# Patient Record
Sex: FEMALE | Race: WHITE
[De-identification: names, ages, dates, MRNs, and addresses within clinical notes are randomized per-mention and may not be internally consistent; named-entity substitution may affect disease eponyms.]

---

## 2019-01-04 ENCOUNTER — HOSPITAL ENCOUNTER (EMERGENCY)
Dept: HOSPITAL 41 - JD.ED | Age: 74
Discharge: HOME | End: 2019-01-04
Payer: MEDICARE

## 2019-01-04 DIAGNOSIS — K44.9: Primary | ICD-10-CM

## 2019-01-04 DIAGNOSIS — Z79.899: ICD-10-CM

## 2019-01-04 DIAGNOSIS — K21.9: ICD-10-CM

## 2019-01-04 PROCEDURE — 84484 ASSAY OF TROPONIN QUANT: CPT

## 2019-01-04 PROCEDURE — 99284 EMERGENCY DEPT VISIT MOD MDM: CPT

## 2019-01-04 PROCEDURE — 83690 ASSAY OF LIPASE: CPT

## 2019-01-04 PROCEDURE — 82553 CREATINE MB FRACTION: CPT

## 2019-01-04 PROCEDURE — 85007 BL SMEAR W/DIFF WBC COUNT: CPT

## 2019-01-04 PROCEDURE — 74018 RADEX ABDOMEN 1 VIEW: CPT

## 2019-01-04 PROCEDURE — 71045 X-RAY EXAM CHEST 1 VIEW: CPT

## 2019-01-04 PROCEDURE — 80053 COMPREHEN METABOLIC PANEL: CPT

## 2019-01-04 PROCEDURE — 36415 COLL VENOUS BLD VENIPUNCTURE: CPT

## 2019-01-04 PROCEDURE — 96360 HYDRATION IV INFUSION INIT: CPT

## 2019-01-04 PROCEDURE — 85027 COMPLETE CBC AUTOMATED: CPT

## 2019-01-04 PROCEDURE — 83735 ASSAY OF MAGNESIUM: CPT

## 2019-01-04 PROCEDURE — 93005 ELECTROCARDIOGRAM TRACING: CPT

## 2019-01-04 NOTE — CR
Chest: Frontal view of the chest was obtained.

 

Comparison: Prior chest x-ray of 12/03/11.

 

Heart size and mediastinum are within normal limits.  Mild bibasilar 

atelectasis is seen.  Lungs otherwise are clear.  Bony structures are 

grossly intact.

 

Impression:

1.  Slight bibasilar atelectasis, nothing acute seen on frontal chest 

x-ray.

 

Diagnostic code #2

## 2019-01-04 NOTE — EDM.PDOC
<Noam Chavez - Last Filed: 01/04/19 07:53>





ED HPI GENERAL MEDICAL PROBLEM





- General


Chief Complaint: Abdominal Pain


Stated Complaint: HEARTBURN PAIN


Time Seen by Provider: 01/04/19 06:09





- Related Data


 Allergies











Allergy/AdvReac Type Severity Reaction Status Date / Time


 


No Known Allergies Allergy   Verified 01/04/19 06:01











Home Meds: 


 Home Meds





Levothyroxine Sodium 88 mcg PO DAILY 04/10/14 [History]


Alendronate Sodium [Fosamax] 1 tab PO WEEKLY 01/04/19 [History]


Hyoscyamine Sulfate [Levsin-Sl] 0.125 mg SL ASDIRECTED #8 tab.subl 01/04/19 [Rx]











Course





- Vital Signs


Last Recorded V/S: 


 Last Vital Signs











Temp  36.5 C   01/04/19 05:57


 


Pulse  78   01/04/19 05:57


 


Resp  18   01/04/19 05:57


 


BP  164/90 H  01/04/19 05:57


 


Pulse Ox  97   01/04/19 05:57














- Orders/Labs/Meds


Labs: 


 Laboratory Tests











  01/04/19 01/04/19 Range/Units





  06:30 06:30 


 


WBC  6.05   (3.98-10.04)  K/mm3


 


RBC  4.56   (3.98-5.22)  M/mm3


 


Hgb  13.9   (11.2-15.7)  gm/L


 


Hct  41.9   (34.1-44.9)  %


 


MCV  91.9   (79.4-94.8)  fl


 


MCH  30.5   (25.6-32.2)  pg


 


MCHC  33.2   (32.2-35.5)  g/dl


 


RDW Std Deviation  42.7   (36.4-46.3)  fL


 


Plt Count  297   (182-369)  K/mm3


 


MPV  9.5   (9.4-12.3)  fl


 


Neutrophils % (Manual)  77 H   (40-60)  %


 


Band Neutrophils %  7   (0-10)  %


 


Lymphocytes % (Manual)  13 L   (20-40)  %


 


Atypical Lymphs %  0   %


 


Monocytes % (Manual)  2   (2-10)  %


 


Eosinophils % (Manual)  0 L   (0.7-5.8)  %


 


Basophils % (Manual)  1   (0.1-1.2)  


 


Platelet Estimate  Adequate   


 


RBC Morph Comment  Normal   


 


Sodium   135 L  (136-145)  mEq/L


 


Potassium   4.2  (3.5-5.1)  mEq/L


 


Chloride   100  ()  mEq/L


 


Carbon Dioxide   24  (21-32)  mEq/L


 


Anion Gap   15.2 H  (5-15)  


 


BUN   13  (7-18)  mg/dL


 


Creatinine   1.1 H  (0.55-1.02)  mg/dL


 


Est Cr Clr Drug Dosing   38.16  mL/min


 


Estimated GFR (MDRD)   48  (>60)  mL/min


 


BUN/Creatinine Ratio   11.8 L  (14-18)  


 


Glucose   128 H  ()  mg/dL


 


Calcium   9.5  (8.5-10.1)  mg/dL


 


Magnesium   1.7 L  (1.8-2.4)  mg/dl


 


Total Bilirubin   0.4  (0.2-1.0)  mg/dL


 


AST   15  (15-37)  U/L


 


ALT   19  (14-59)  U/L


 


Alkaline Phosphatase   74  ()  U/L


 


CK-MB (CK-2)   0.9  (0-3.6)  ng/ml


 


Troponin I   < 0.017  (0.00-0.056)  ng/mL


 


Total Protein   7.6  (6.4-8.2)  g/dl


 


Albumin   3.7  (3.4-5.0)  g/dl


 


Globulin   3.9  gm/dL


 


Albumin/Globulin Ratio   1.0  (1-2)  


 


Lipase   64 L  ()  U/L











Meds: 


Medications














Discontinued Medications














Generic Name Dose Route Start Last Admin





  Trade Name Freq  PRN Reason Stop Dose Admin


 


Al Hydroxide/Mg Hydroxide 30  0 ml  01/04/19 06:14  01/04/19 06:30





  ml/ Lidocaine HCl 15 ml  PO  01/04/19 06:15  45 ml





  ONETIME ONE   Administration





     





     





     





     


 


Dicyclomine HCl  20 mg  01/04/19 06:49  01/04/19 06:54





  Bentyl  PO  01/04/19 06:50  20 mg





  ONETIME ONE   Administration





     





     





     





     


 


Hydromorphone HCl  0.25 mg  01/04/19 06:50  01/04/19 07:52





  Dilaudid  IVPUSH  01/04/19 06:51  Not Given





  ONETIME ONE   





     





     





     





     


 


Hyoscyamine  0.125 mg  01/04/19 06:49  01/04/19 06:54





  Hyomax-Sl  SL  01/04/19 06:50  0.125 mg





  ONETIME ONE   Administration





     





     





     





     


 


Sodium Chloride  1,000 mls @ 125 mls/hr  01/04/19 06:30  01/04/19 06:30





  Normal Saline  IV   125 mls/hr





  ASDIRECTED JIM   Administration





     





     





     





     


 


Metoclopramide HCl  7.5 mg  01/04/19 06:49  01/04/19 07:52





  Reglan  IVPUSH  01/04/19 06:50  Not Given





  ONETIME ONE   





     





     





     





     














- Re-Assessments/Exams


Free Text/Narrative Re-Assessment/Exam: 





01/04/19 07:34


Labs nondiagnostic. She continues to do well we will discharge on Levsin





Departure





- Departure


Time of Disposition: 07:53


Disposition: Home, Self-Care 01


Clinical Impression: 


 Hiatal hernia with GERD





Abdominal pain


Qualifiers:


 Abdominal location: epigastric Qualified Code(s): R10.13 - Epigastric pain








- Discharge Information


Prescriptions: 


Hyoscyamine Sulfate [Levsin-Sl] 0.125 mg SL ASDIRECTED #8 tab.subl


Instructions:  Hiatal Hernia


Referrals: 


L[facility] NP [Primary Care Provider] - 


Forms:  ED Department Discharge


Additional Instructions: 


Evaluation the emergency room today in regards to persistent epigastric 

abdominal pain since 11:00 last night. Relieved by burping and belching. 

History of reflux disease that is been more frequent as of late. Examination 

did not reveal any obvious source of the pain. Heart tracing did not show any 

signs of heart related illness. Chest x-ray was normal. X-ray of the abdomen 

showed some increased stool in the right hemicolon but no sign of bowel 

obstruction. The working diagnosis was likely hiatal hernia which means stomach 

is herniated up into the lower chest cavity. You're treated in the ED with a GI 

cocktail with no relief of the discomfort. Levsin under the tongue and Bentyl 

20 mg by mouth did seem to relieve the discomfort.





<Jeffry Domingo - Last Filed: 01/06/19 07:01>





ED HPI GENERAL MEDICAL PROBLEM





- General


Source of Information: Reports: Patient


History Limitations: Reports: No Limitations





- History of Present Illness


INITIAL COMMENTS - FREE TEXT/NARRATIVE: 





73-year-old female presents to the ED with persistent epigastric lower 

retrosternal discomfort since about 2300 hrs. last night. Burping and belching 

seems to help a little bit. Patient has had more heartburn over the last month 

or so. She took some Coke middle of the night and did get some extra burping 

from this. She then vomited a bit of it as well. She is mildly nauseated with 

the pain. It does not radiate through to her back. Breathing doesn't make it 

any worse. Is no known history of heart disease. Has known high cholesterol. 

Has no true odynophagia. Took 6 Tums for relief of the discomfort overnight 

with no relief.


Onset: Gradual


Onset Date: 01/03/19


Onset Time: 23:00


Duration: Hour(s):, Constant


Location: Reports: Chest, Abdomen (epigastrium)


Quality: Reports: Ache, Pressure


Severity: Moderate


Improves with: Reports: None


Worsens with: Reports: None


Context: Reports: Other (Spontaneous occurrence).  Denies: Activity, Exercise, 

Lifting, Sick Contact, Trauma


Associated Symptoms: Reports: Chest Pain, Loss of Appetite, Malaise, Nausea/

Vomiting (Vomited a small amount of Coke).  Denies: Confusion, Cough (Lower 

retrosternal pressure discomfort. Slight burning discomfort with it.), cough w 

sputum, Diaphoresis, Fever/Chills, Headaches, Rash ( about 4:00.), Seizure, 

Shortness of Breath, Syncope


Treatments PTA: Reports: Other (see below) (6 Tums overnight with no relief)


  ** Epigastric


Pain Score (Numeric/FACES): 6





Past Medical History


Genitourinary History: Reports: UTI, Recurrent


OB/GYN History: Reports: Pregnancy


Endocrine/Metabolic History: Reports: Hypothyroidism (On levo thyroxine 

supplement.)





- Past Surgical History


Female  Surgical History: Reports: Hysterectomy





Social & Family History





- Tobacco Use


Smoking Status *Q: Never Smoker





- Caffeine Use


Caffeine Use: Reports: None





- Recreational Drug Use


Recreational Drug Use: No





- Living Situation & Occupation


Living situation: Reports:  (Self-employed), Alone


Occupation: Employed





ED ROS GENERAL





- Review of Systems


Review Of Systems: See Below


Constitutional: Reports: Decreased Appetite.  Denies: Fever, Chills, Malaise, 

Weakness, Fatigue, Weight Loss


HEENT: Reports: Glasses


Respiratory: Reports: No Symptoms


Cardiovascular: Reports: Chest Pain.  Denies: Blood Pressure Problem, 

Claudication (Not usually but blood pressure is elevated upon arrival in the 

ED. 0.60 4/90), Dyspnea on Exertion, Edema, Lightheadedness, Orthopnea


Endocrine: Reports: No Symptoms


GI/Abdominal: Reports: Abdominal Pain.  Denies: Anorexia, Black Stool, Bloody 

Stool, Constipation, Diarrhea, Decreased Appetite, Difficulty Swallowing, 

Distension, Flatus, Hematemesis, Hematochezia, Melena


: Reports: Frequency


Musculoskeletal: Reports: Joint Pain (Some joint pain knees hips and back.)


Skin: Reports: No Symptoms


Neurological: Reports: No Symptoms


Psychiatric: Reports: No Symptoms


Hematologic/Lymphatic: Reports: No Symptoms


Immunologic: Reports: No Symptoms





ED EXAM, GI/ABD





- Physical Exam


Exam: See Below


Exam Limited By: No Limitations


General Appearance: Alert, WD/WN, No Apparent Distress, Other (Vital signs are 

all stable.)


Eyes: Bilateral: Normal Appearance


Head: Atraumatic, Normocephalic


Neck: Normal Inspection, Supple, Non-Tender, Full Range of Motion.  No: Carotid 

Bruit, Lymphadenopathy (L), Lymphadenopathy (R)


Respiratory/Chest: No Respiratory Distress, Lungs Clear, Normal Breath Sounds, 

No Accessory Muscle Use, Chest Non-Tender


Cardiovascular: Normal Peripheral Pulses, Regular Rate, Rhythm, No Edema, No 

Gallop, No Murmur, No Rub


GI/Abdominal Exam: Normal Bowel Sounds, Soft, Non-Tender, No Organomegaly, No 

Abnormal Bruit, No Mass, Pelvis Stable, Other (No evidence of gallbladder 

related illness. It is not worsened by deep palpation in the epigastrium.)


Extremities: Normal Inspection, Normal Range of Motion, Non-Tender, No Pedal 

Edema


Neurological: Alert, Oriented, CN II-XII Intact, Normal Cognition, Normal Gait


Psychiatric: Normal Affect, Normal Mood


Skin Exam: Warm, Dry, Intact, Normal Color, No Rash





EKG INTERPRETATION


EKG Date: 01/04/19


Time: 06:20


Rhythm: NSR


Rate (Beats/Min): 74


Axis: LAD-Left Axis Deviation (-25)


P-Wave: Present


QRS: Other (There is early R-wave transition V2 and then delayed her very poor R

-wave progression V3 to V6. There are near Q-wave in lead 3 and a Q-wave in 

aVF. Consider old inferior wall myocardial infarction. There is decreased 

voltage in both limb and precordial leads.)


ST-T: Other (Some T-wave flattening in lead 3 nonspecific finding.)


QT: Prolonged (Borderline prolonged)


EKG Interpretation Comments: 





Abnormal ECG





Course





- Orders/Labs/Meds


Labs: 


 Laboratory Tests











  01/04/19 01/04/19 Range/Units





  06:30 06:30 


 


WBC  6.05   (3.98-10.04)  K/mm3


 


RBC  4.56   (3.98-5.22)  M/mm3


 


Hgb  13.9   (11.2-15.7)  gm/L


 


Hct  41.9   (34.1-44.9)  %


 


MCV  91.9   (79.4-94.8)  fl


 


MCH  30.5   (25.6-32.2)  pg


 


MCHC  33.2   (32.2-35.5)  g/dl


 


RDW Std Deviation  42.7   (36.4-46.3)  fL


 


Plt Count  297   (182-369)  K/mm3


 


MPV  9.5   (9.4-12.3)  fl


 


Neutrophils % (Manual)  77 H   (40-60)  %


 


Band Neutrophils %  7   (0-10)  %


 


Lymphocytes % (Manual)  13 L   (20-40)  %


 


Atypical Lymphs %  0   %


 


Monocytes % (Manual)  2   (2-10)  %


 


Eosinophils % (Manual)  0 L   (0.7-5.8)  %


 


Basophils % (Manual)  1   (0.1-1.2)  


 


Platelet Estimate  Adequate   


 


RBC Morph Comment  Normal   


 


Sodium   135 L  (136-145)  mEq/L


 


Potassium   4.2  (3.5-5.1)  mEq/L


 


Chloride   100  ()  mEq/L


 


Carbon Dioxide   24  (21-32)  mEq/L


 


Anion Gap   15.2 H  (5-15)  


 


BUN   13  (7-18)  mg/dL


 


Creatinine   1.1 H  (0.55-1.02)  mg/dL


 


Est Cr Clr Drug Dosing   38.16  mL/min


 


Estimated GFR (MDRD)   48  (>60)  mL/min


 


BUN/Creatinine Ratio   11.8 L  (14-18)  


 


Glucose   128 H  ()  mg/dL


 


Calcium   9.5  (8.5-10.1)  mg/dL


 


Magnesium   1.7 L  (1.8-2.4)  mg/dl


 


Total Bilirubin   0.4  (0.2-1.0)  mg/dL


 


AST   15  (15-37)  U/L


 


ALT   19  (14-59)  U/L


 


Alkaline Phosphatase   74  ()  U/L


 


CK-MB (CK-2)   0.9  (0-3.6)  ng/ml


 


Troponin I   < 0.017  (0.00-0.056)  ng/mL


 


Total Protein   7.6  (6.4-8.2)  g/dl


 


Albumin   3.7  (3.4-5.0)  g/dl


 


Globulin   3.9  gm/dL


 


Albumin/Globulin Ratio   1.0  (1-2)  


 


Lipase   64 L  ()  U/L











Meds: 


Medications














Discontinued Medications














Generic Name Dose Route Start Last Admin





  Trade Name Salq  PRN Reason Stop Dose Admin


 


Al Hydroxide/Mg Hydroxide 30  0 ml  01/04/19 06:14  01/04/19 06:30





  ml/ Lidocaine HCl 15 ml  PO  01/04/19 06:15  45 ml





  ONETIME ONE   Administration





     





     





     





     


 


Dicyclomine HCl  20 mg  01/04/19 06:49  01/04/19 06:54





  Bentyl  PO  01/04/19 06:50  20 mg





  ONETIME ONE   Administration





     





     





     





     


 


Hydromorphone HCl  0.25 mg  01/04/19 06:50  01/04/19 07:52





  Dilaudid  IVPUSH  01/04/19 06:51  Not Given





  ONETIME ONE   





     





     





     





     


 


Hyoscyamine  0.125 mg  01/04/19 06:49  01/04/19 06:54





  Hyomax-Sl  SL  01/04/19 06:50  0.125 mg





  ONETIME ONE   Administration





     





     





     





     


 


Sodium Chloride  1,000 mls @ 125 mls/hr  01/04/19 06:30  01/04/19 06:30





  Normal Saline  IV   125 mls/hr





  ASDIRECTED JIM   Administration





     





     





     





     


 


Metoclopramide HCl  7.5 mg  01/04/19 06:49  01/04/19 07:52





  Reglan  IVPUSH  01/04/19 06:50  Not Given





  ONETIME ONE   





     





     





     





     














- Radiology Interpretation


Free Text/Narrative:: 





73-year-old female attends the ED with persistent epigastric lower retrosternal 

pressure discomfort since 11:00 last night. It started after she went to bed. 

She never slept the rest of the night because of the discomfort. Burping and 

belching seems to give her some relief of the discomfort but not total relief. 

She has a history of GERD and is been more active the last month or so. She 

took 6 times during the night however with no relief of the discomfort. The 

pain does not radiate through to her back or into her neck or arms. Her ECG 

does not show any signs of acute ischemia. Lungs are clear to auscultation 

percussion. Benign abdominal exam. Clinical suspicion is that of a hiatal 

hernia that his slid up into her chest. Plan GI cocktail and see if this 

provides any relief. X-ray of her chest and abdomen to be done one view each 

routine labs including cardiac markers and serum lipase. Clinically there was 

no evidence of gallbladder related illness.





- Re-Assessments/Exams


Free Text/Narrative Re-Assessment/Exam: 





01/04/19 06:48 patient reports no relief with the GI cocktail. He is aware of a 

spasm type pain in the pit of her stomach this is highly suggestive of a hiatal 

hernia. I will give her a small dose of Dilaudid as she is quite sensitive to 

pain medication usually causing nausea and vomiting. Will give Reglan 7.5 mg IV 

first followed by 0.25 mg of Dilaudid. We'll also give her Bentyl 20 mg by 

mouth aND lEVSIN 0.125MG S/L. 





01/04/19 07:07  just as the patient was about to receive the Reglan and 

Dilaudid she reported that her pain completely went away. Appears that the 

Levsin under the tongue did the trick. This strongly suggests that it was 

hiatal hernia pain that caused her discomfort. Care will be turned over to Dr. Chavez as it is  change of shift. Chest x-ray is within normal limits. X-ray 

the hand shows some increased stool in the right hemicolon but no signs of 

bowel obstruction. Labs are pending.





01/04/19 07:10 Labs reveal a normal white count at 6.05. 77% neutrophils and 7% 

band cells however. Hemoglobin is 13.9 with hematocrit of 41.9. Reticulocyte 

count 297,000. Sodium 135 with a potassium of 4.2..[id number] with a 

bicarbonate 24. And a gap is 15.2. BUNs 13 with a creatinine of 1.1. GFR is 48. 

Glucose 128. Calcium 9.5. Magnesium is 1.7. Bilirubin 0.4. AST 15 ALT 19 alk 

phosphatase 74. CK-MB fraction is 0.9 with troponin I of less than 0.017. 

Lipase is 64








Departure





- Departure


Condition: Fair





- Discharge Information


*PRESCRIPTION DRUG MONITORING PROGRAM REVIEWED*: Not Applicable


*COPY OF PRESCRIPTION DRUG MONITORING REPORT IN PATIENT [facility]: Not Applicable

## 2019-01-04 NOTE — CR
Abdomen: Supine view of the abdomen was obtained.

 

Comparison: No prior abdominal x-ray.

 

Bowel gas pattern is normal.  Bony structures are within normal 

limits.  Calcifications are seen within the pelvis which are 

compatible with phleboliths.

 

Impression:

1.  Nothing acute is seen on supine abdominal x-ray.

 

Diagnostic code #2